# Patient Record
Sex: MALE | Race: WHITE | URBAN - METROPOLITAN AREA
[De-identification: names, ages, dates, MRNs, and addresses within clinical notes are randomized per-mention and may not be internally consistent; named-entity substitution may affect disease eponyms.]

---

## 2017-02-07 ENCOUNTER — IMPORTED ENCOUNTER (OUTPATIENT)
Dept: URBAN - METROPOLITAN AREA CLINIC 50 | Facility: CLINIC | Age: 82
End: 2017-02-07

## 2017-05-30 ENCOUNTER — IMPORTED ENCOUNTER (OUTPATIENT)
Dept: URBAN - METROPOLITAN AREA CLINIC 50 | Facility: CLINIC | Age: 82
End: 2017-05-30

## 2017-06-27 ENCOUNTER — IMPORTED ENCOUNTER (OUTPATIENT)
Dept: URBAN - METROPOLITAN AREA CLINIC 50 | Facility: CLINIC | Age: 82
End: 2017-06-27

## 2017-12-19 ENCOUNTER — IMPORTED ENCOUNTER (OUTPATIENT)
Dept: URBAN - METROPOLITAN AREA CLINIC 50 | Facility: CLINIC | Age: 82
End: 2017-12-19

## 2018-03-14 ENCOUNTER — IMPORTED ENCOUNTER (OUTPATIENT)
Dept: URBAN - METROPOLITAN AREA CLINIC 50 | Facility: CLINIC | Age: 83
End: 2018-03-14

## 2018-04-19 NOTE — PATIENT DISCUSSION
POSTERIOR CAPSULAR FIBROSIS/OPACIFICATION, OU- VISUALLY SIGNIFICANT OD.  SCHEDULE YAG CAPSULOTOMY.  UPDATED GLS RX GIVEN FOR PT TO FILL IN THE EVENT HE DOES NOT PROCEED W/ SX.

## 2018-04-19 NOTE — PATIENT DISCUSSION
Posterior Capsular Fibrosis/Opacification Counseling: I have discussed the option of glasses versus YAG laser surgery versus  following. It was explained that when vision no longer meets the patient's needs, and when a new prescription for glasses is not likely to improve the patient's visual symptoms, the option of YAG laser surgery is a reasonable next step. It was  explained that there is no guarantee that removing the PCF/PCO will improve their visual symptoms. The risks, benefits and alternatives of surgery were discussed with the patient. The uncommon risk of an increase in intraocular pressure or a retinal detachment and their associated symptoms were explained to the patient. After this discussion, the patient desires to proceed with YAG laser to improve vision for driving safely and watching TV.

## 2018-05-24 ENCOUNTER — IMPORTED ENCOUNTER (OUTPATIENT)
Dept: URBAN - METROPOLITAN AREA CLINIC 50 | Facility: CLINIC | Age: 83
End: 2018-05-24

## 2019-01-11 ENCOUNTER — IMPORTED ENCOUNTER (OUTPATIENT)
Dept: URBAN - METROPOLITAN AREA CLINIC 50 | Facility: CLINIC | Age: 84
End: 2019-01-11

## 2019-07-12 ENCOUNTER — IMPORTED ENCOUNTER (OUTPATIENT)
Dept: URBAN - METROPOLITAN AREA CLINIC 50 | Facility: CLINIC | Age: 84
End: 2019-07-12

## 2019-12-03 ENCOUNTER — IMPORTED ENCOUNTER (OUTPATIENT)
Dept: URBAN - METROPOLITAN AREA CLINIC 50 | Facility: CLINIC | Age: 84
End: 2019-12-03

## 2021-04-17 ASSESSMENT — VISUAL ACUITY
OD_BAT: 20/30
OS_CC: 20/40
OD_OTHER: 20/60. 20/400.
OS_CC: J4
OS_BAT: 20/70
OD_CC: 20/25+
OS_CC: 20/50-
OS_OTHER: 20/60-. >20/400.
OS_BAT: 20/60-
OD_CC: 20/30
OS_CC: 20/70-1
OD_CC: J5
OS_CC: 20/60
OD_OTHER: 20/30. 20/40.
OS_OTHER: 20/70. 20/200.
OD_CC: 20/40
OD_BAT: 20/60
OS_CC: 20/25
OS_CC: 20/40
OD_CC: J4
OS_CC: J2-
OD_CC: J2-
OD_CC: 20/25+2
OS_CC: J5
OD_CC: 20/40
OS_CC: 20/40
OD_CC: 20/30+

## 2021-04-17 ASSESSMENT — TONOMETRY
OS_IOP_MMHG: 15
OS_IOP_MMHG: 16
OD_IOP_MMHG: 12
OD_IOP_MMHG: 16
OD_IOP_MMHG: 22
OS_IOP_MMHG: 12
OD_IOP_MMHG: 12
OS_IOP_MMHG: 21
OS_IOP_MMHG: 12
OD_IOP_MMHG: 17
OS_IOP_MMHG: 18
OD_IOP_MMHG: 30
OD_IOP_MMHG: 14
OS_IOP_MMHG: 26

## 2024-12-06 NOTE — PATIENT DISCUSSION
"""Follow ERM w/o surgery. Call if vision decreases or distortion increases. Recommend regular Amsler checks.  """ Pt knows of need for urine at this time.